# Patient Record
Sex: MALE | Race: WHITE | NOT HISPANIC OR LATINO | Employment: OTHER | URBAN - METROPOLITAN AREA
[De-identification: names, ages, dates, MRNs, and addresses within clinical notes are randomized per-mention and may not be internally consistent; named-entity substitution may affect disease eponyms.]

---

## 2021-01-20 ENCOUNTER — IMMUNIZATION (OUTPATIENT)
Dept: VACCINE CLINIC | Facility: HOSPITAL | Age: 86
End: 2021-01-20

## 2021-01-20 PROCEDURE — 0001A: CPT | Performed by: INTERNAL MEDICINE

## 2021-01-20 PROCEDURE — 91300 HC SARSCOV02 VAC 30MCG/0.3ML IM: CPT | Performed by: INTERNAL MEDICINE

## 2021-02-10 ENCOUNTER — IMMUNIZATION (OUTPATIENT)
Dept: VACCINE CLINIC | Facility: HOSPITAL | Age: 86
End: 2021-02-10

## 2021-02-10 PROCEDURE — 0002A: CPT | Performed by: INTERNAL MEDICINE

## 2021-02-10 PROCEDURE — 91300 HC SARSCOV02 VAC 30MCG/0.3ML IM: CPT | Performed by: INTERNAL MEDICINE

## 2023-05-03 ENCOUNTER — HOSPITAL ENCOUNTER (OUTPATIENT)
Dept: RADIATION ONCOLOGY | Facility: HOSPITAL | Age: 88
Setting detail: RADIATION/ONCOLOGY SERIES
Discharge: HOME | End: 2023-05-03
Payer: MEDICARE

## 2023-05-03 ENCOUNTER — HOSPITAL ENCOUNTER (OUTPATIENT)
Dept: RADIATION ONCOLOGY | Facility: HOSPITAL | Age: 88
Discharge: HOME | End: 2023-05-03

## 2023-05-03 ENCOUNTER — OFFICE VISIT (OUTPATIENT)
Dept: RADIATION ONCOLOGY | Facility: HOSPITAL | Age: 88
End: 2023-05-03
Payer: MEDICARE

## 2023-05-03 VITALS
SYSTOLIC BLOOD PRESSURE: 186 MMHG | WEIGHT: 142.1 LBS | TEMPERATURE: 97.8 F | HEART RATE: 64 BPM | RESPIRATION RATE: 22 BRPM | DIASTOLIC BLOOD PRESSURE: 86 MMHG | BODY MASS INDEX: 22.3 KG/M2 | HEIGHT: 67 IN | OXYGEN SATURATION: 94 %

## 2023-05-03 DIAGNOSIS — C34.31 MALIGNANT NEOPLASM OF LOWER LOBE OF RIGHT LUNG: Primary | ICD-10-CM

## 2023-05-03 PROCEDURE — 77332 RADIATION TREATMENT AID(S): CPT | Performed by: RADIOLOGY

## 2023-05-03 PROCEDURE — 77290 THER RAD SIMULAJ FIELD CPLX: CPT | Performed by: RADIOLOGY

## 2023-05-03 PROCEDURE — G0463 HOSPITAL OUTPT CLINIC VISIT: HCPCS | Performed by: RADIOLOGY

## 2023-05-03 PROCEDURE — G0463 HOSPITAL OUTPT CLINIC VISIT: HCPCS

## 2023-05-03 RX ORDER — LATANOPROST 50 UG/ML
1 SOLUTION/ DROPS OPHTHALMIC DAILY
COMMUNITY
Start: 2022-11-15

## 2023-05-03 RX ORDER — HYDRALAZINE HYDROCHLORIDE 100 MG/1
100 TABLET, FILM COATED ORAL 3 TIMES DAILY
COMMUNITY
Start: 2022-11-23

## 2023-05-03 RX ORDER — L.ACID,CASEI/B.ANIMAL/S.THERMO 16B CELL
1 CAPSULE ORAL DAILY
COMMUNITY

## 2023-05-03 RX ORDER — TIMOLOL MALEATE 5 MG/ML
SOLUTION/ DROPS OPHTHALMIC
COMMUNITY
Start: 2023-02-28

## 2023-05-03 NOTE — PROGRESS NOTES
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 male  and good friend of [facility] we call Al.  In  he underwent left upper lobectomy with mediastinal lymph node sampling for a 1.7 x 1.5 x 1.2 cm moderately to poorly differentiated infiltrating adenocarcinoma, 1/10 peribronchial node was positive for tumor.  He underwent 2 cycles of cisplatin/Novell.  He has had a right lung nodule that has been followed on scans.  This recently increased in size.  Biopsy was positive for adenocarcinoma.  He underwent PET scan that showed a hypermetabolic in the right lung measuring 2.5 cm with SUV of 5.5.  In the right lower lobe is a 7.5 mm pulmonary nodule that was not PET avid.  There was also hypermetabolic esophageal thickening.  Dr. Clarke is going to order endoscopy.  Al has no odynophagia or dysphagia.  He denies hemoptysis, shortness of breath, chest pain or cough.  At the time of the biopsy he had a small pneumothorax that has since resolved.  Al is here to discuss CyberKnife SBRT to the right lung cancer.  Al is accompanied by my good friend and neighbor Winston Morrison.      BMI:  Body mass index is 22.26 kg/m².      Social History     Substance and Sexual Activity   Alcohol Use Never       Allergies   Allergen Reactions   • Amoxicillin Shortness Of Breath   • Amlodipine Other (See Comments)   • Benzalkonium Chloride Other (See Comments)   • Umzar-Jjfkq-Aozsmhf-Pramoxine Other (See Comments)       Social History     Tobacco Use   • Smoking status: Former     Years: 35.00     Types: Cigarettes     Quit date:      Years since quittin.3   •  "Smokeless tobacco: Never   Vaping Use   • Vaping Use: Never used   Substance Use Topics   • Alcohol use: Never   • Drug use: Never         Past Medical History:   Diagnosis Date   • Cataracts, bilateral    • COPD (chronic obstructive pulmonary disease)    • Glaucoma    • Hypertension    • Lung cancer        family history includes Breast cancer in his mother.     Past Surgical History:   Procedure Laterality Date   • GLAUCOMA SURGERY     • HERNIA REPAIR     • LUNG SURGERY Left 01/14/2009    Left Upper Lobectomy   • TONSILLECTOMY AND ADENOIDECTOMY          Review of Systems   Constitutional: Negative.    Genitourinary: Positive for frequency.            Objective   VITAL SIGNS:   Vitals:    05/03/23 1327   BP: (!) 186/86   Pulse: 64   Resp: 22   Temp: 97.8 °F (36.6 °C)   TempSrc: Temporal   SpO2: 94%   Weight: 64.5 kg (142 lb 1.6 oz)   Height: 170.2 cm (67\")   PainSc: 0-No pain        KPS       90%    Physical Exam  Vitals reviewed.   Constitutional:       Appearance: Normal appearance.   Cardiovascular:      Rate and Rhythm: Normal rate and regular rhythm.   Pulmonary:      Effort: Pulmonary effort is normal.              The following portions of the patient's history were reviewed and updated as appropriate: allergies, current medications, past family history, past medical history, past social history, past surgical history and problem list.    Assessment      IMPRESSION:    Thien Morrison \"Al\"  is an 87 y.o. male  with history of left upper lobectomy for T1N1 adenocarcinoma of the left lung.  He has a right lung nodule biopsy was positive  adenocarcinoma.  He underwent PET scan that showed a hypermetabolic mas in the right lung measuring 2.5 cm with SUV of 5.5.  In the right lower lobe was a 7.5 mm pulmonary nodule that was not PET avid.  There was also hypermetabolic esophageal thickening.  Dr. Clarke is going to order endoscopy.  Al is here to discuss CyberKnife SBRT to the right lung " cancer.        RECOMMENDATIONS: The pros and cons, risks and benefits of CyberKnife stereotactic body radiotherapy were discussed with Al and Winston. I spent time today with them discussing the different treatment options for the new lung nodule which is pathologically consistent with a primary lung cancer.  I think Al is a very good candidate for stereotactic body radiation therapy using the Cyberknife Treatment unit as this would not only yield a high chance of controlling the involved site of disease, but we will also limit  potential toxicities to normal tissues including  detriment in lung function.    We have an opening today to undergo a treatment simulation and planning session. We will treat the tumor in 3-5 fractions to a cumulative dose of 45-54 Gy depending on our ability to limit the radiation dose to the uninvolved normal lung, heart, esophagus, and chest wall. We will try to begin treatment within the next 1-2 weeks pending insurance authorization. It is a pleasure to see Al and Winston.                       Khloe aTvarez MD    Total time of patient care on day of service including time prior to, face to face with patient, and following visit spent in reviewing records, lab results, imaging studies, discussion with patient, and documentation/charting was > 45 minutes.    Errors in dictation may reflect use of voice recognition software and not all errors in transcription may have been detected prior to signing.

## 2023-05-09 PROCEDURE — 77334 RADIATION TREATMENT AID(S): CPT | Performed by: RADIOLOGY

## 2023-05-09 PROCEDURE — 77300 RADIATION THERAPY DOSE PLAN: CPT | Performed by: RADIOLOGY

## 2023-05-09 PROCEDURE — 77295 3-D RADIOTHERAPY PLAN: CPT | Performed by: RADIOLOGY

## 2023-05-15 ENCOUNTER — HOSPITAL ENCOUNTER (OUTPATIENT)
Dept: RADIATION ONCOLOGY | Facility: HOSPITAL | Age: 88
Discharge: HOME | End: 2023-05-15

## 2023-05-15 ENCOUNTER — TELEPHONE (OUTPATIENT)
Dept: RADIATION ONCOLOGY | Facility: HOSPITAL | Age: 88
End: 2023-05-15
Payer: MEDICARE

## 2023-05-15 PROCEDURE — 77332 RADIATION TREATMENT AID(S): CPT | Performed by: RADIOLOGY

## 2023-05-15 PROCEDURE — 77373 STRTCTC BDY RAD THER TX DLVR: CPT | Performed by: RADIOLOGY

## 2023-05-15 PROCEDURE — 77280 THER RAD SIMULAJ FIELD SMPL: CPT | Performed by: RADIOLOGY

## 2023-05-15 NOTE — TELEPHONE ENCOUNTER
Patient returned my call regarding recent increase in GFR and appointment with a Nephrologist.  Patient stated that he had been seen by Dr. Pelon Maciel at [place] in 4/23 and he had patient complete 2 scans and some lab work, but the patient did not know the results.  Will call and get records from Dr. Maciel's office.

## 2023-05-16 ENCOUNTER — HOSPITAL ENCOUNTER (OUTPATIENT)
Dept: RADIATION ONCOLOGY | Facility: HOSPITAL | Age: 88
Discharge: HOME | End: 2023-05-16

## 2023-05-16 PROCEDURE — 77373 STRTCTC BDY RAD THER TX DLVR: CPT | Performed by: RADIOLOGY

## 2023-05-16 PROCEDURE — 77280 THER RAD SIMULAJ FIELD SMPL: CPT | Performed by: RADIOLOGY

## 2023-05-17 ENCOUNTER — HOSPITAL ENCOUNTER (OUTPATIENT)
Dept: RADIATION ONCOLOGY | Facility: HOSPITAL | Age: 88
Discharge: HOME | End: 2023-05-17

## 2023-05-17 PROCEDURE — 77280 THER RAD SIMULAJ FIELD SMPL: CPT | Performed by: RADIOLOGY

## 2023-05-17 PROCEDURE — 77373 STRTCTC BDY RAD THER TX DLVR: CPT | Performed by: RADIOLOGY

## 2023-05-18 ENCOUNTER — HOSPITAL ENCOUNTER (OUTPATIENT)
Dept: RADIATION ONCOLOGY | Facility: HOSPITAL | Age: 88
Discharge: HOME | End: 2023-05-18

## 2023-05-18 PROCEDURE — 77373 STRTCTC BDY RAD THER TX DLVR: CPT | Performed by: RADIOLOGY

## 2023-05-18 PROCEDURE — 77336 RADIATION PHYSICS CONSULT: CPT | Performed by: RADIOLOGY

## 2023-05-18 PROCEDURE — 77280 THER RAD SIMULAJ FIELD SMPL: CPT | Performed by: RADIOLOGY
